# Patient Record
Sex: MALE | Race: ASIAN | Employment: FULL TIME | URBAN - METROPOLITAN AREA
[De-identification: names, ages, dates, MRNs, and addresses within clinical notes are randomized per-mention and may not be internally consistent; named-entity substitution may affect disease eponyms.]

---

## 2024-08-08 ENCOUNTER — NURSE ONLY (OUTPATIENT)
Dept: ALLERGY | Facility: CLINIC | Age: 59
End: 2024-08-08

## 2024-08-08 ENCOUNTER — OFFICE VISIT (OUTPATIENT)
Dept: ALLERGY | Facility: CLINIC | Age: 59
End: 2024-08-08
Payer: COMMERCIAL

## 2024-08-08 VITALS
HEART RATE: 84 BPM | BODY MASS INDEX: 26.6 KG/M2 | DIASTOLIC BLOOD PRESSURE: 78 MMHG | SYSTOLIC BLOOD PRESSURE: 124 MMHG | HEIGHT: 71 IN | OXYGEN SATURATION: 96 % | WEIGHT: 190 LBS

## 2024-08-08 DIAGNOSIS — H01.119 EYELID DERMATITIS, ALLERGIC/CONTACT: ICD-10-CM

## 2024-08-08 DIAGNOSIS — Z91.09 ENVIRONMENTAL ALLERGIES: ICD-10-CM

## 2024-08-08 DIAGNOSIS — Z23 NEED FOR COVID-19 VACCINE: ICD-10-CM

## 2024-08-08 DIAGNOSIS — H57.89 EYE SWELLING: Primary | ICD-10-CM

## 2024-08-08 DIAGNOSIS — Z23 FLU VACCINE NEED: ICD-10-CM

## 2024-08-08 DIAGNOSIS — H57.89 EYE SWELLING, BILATERAL: Primary | ICD-10-CM

## 2024-08-08 PROCEDURE — 99204 OFFICE O/P NEW MOD 45 MIN: CPT | Performed by: ALLERGY & IMMUNOLOGY

## 2024-08-08 PROCEDURE — 95004 PERQ TESTS W/ALRGNC XTRCS: CPT | Performed by: ALLERGY & IMMUNOLOGY

## 2024-08-08 RX ORDER — AMOXICILLIN 500 MG/1
CAPSULE ORAL
COMMUNITY

## 2024-08-08 RX ORDER — CIPROFLOXACIN 500 MG/1
500 TABLET, FILM COATED ORAL EVERY 12 HOURS
COMMUNITY
Start: 2024-02-16

## 2024-08-08 RX ORDER — MELOXICAM 15 MG/1
15 TABLET ORAL AS DIRECTED
COMMUNITY

## 2024-08-08 RX ORDER — FLUOCINOLONE ACETONIDE 0.11 MG/ML
OIL TOPICAL
Qty: 118 ML | Refills: 0 | Status: SHIPPED | OUTPATIENT
Start: 2024-08-08

## 2024-08-08 RX ORDER — TAMSULOSIN HYDROCHLORIDE 0.4 MG/1
1 CAPSULE ORAL DAILY
COMMUNITY

## 2024-08-08 RX ORDER — ATORVASTATIN CALCIUM 20 MG/1
1 TABLET, FILM COATED ORAL DAILY
COMMUNITY

## 2024-08-08 RX ORDER — HYDROCODONE BITARTRATE AND ACETAMINOPHEN 5; 325 MG/1; MG/1
TABLET ORAL
COMMUNITY

## 2024-08-08 NOTE — PATIENT INSTRUCTIONS
#1 eyelid dermatitis.  Appears more confined to the skin around the eye rather than within the eye itself.  No better with Zyrtec or Pataday in the past.  Some improvement with hydrocortisone 1% as needed.  Also noted associated with itchy scalp.  May consider eczematous dermatitis versus seborrheic dermatitis.  Patient with concern for cat as a trigger.  Recently moved into his current residency 2 months ago.  Prior residence had cats.  Reviewed May continue with hydrocortisone twice a day up to 7 to 10 days at a time if needed.  Cool compresses as needed  May also consider trial of Derma-Smoothe twice a day as needed  If refractory to above measures will consider Protopic is a steroid free topical use to treat eczematous dermatitis.  Moisturizers include Cetaphil CeraVe Vanicream Aquaphor    #2 environmental allergens  See above skin testing to common indoor allergens to screen for triggers    #3 flu vaccine recommended in the fall    4.  COVID-vaccine booster recommended.  Reviewed I do not have the booster in my office.  Please check with local pharmacy         Orders This Visit:  No orders of the defined types were placed in this encounter.      Meds This Visit:  Requested Prescriptions     Signed Prescriptions Disp Refills    Fluocinolone Acetonide Body (DERMA-SMOOTHE/FS BODY) 0.01 % External Oil 118 mL 0     Sig: Apply to areas of itchy skin 2x/day

## 2024-08-08 NOTE — PROGRESS NOTES
Marc Jorgensen is a 59 year old male.    HPI:     Chief Complaint   Patient presents with    Allergies     Patient states  he moved 2 months ago and previous tenant had a cat and he has been having eye puffiness since being there.thinks he may be allergic to cats.     Patient is a 59-year-old male who presents for allergy evaluation with a chief complaint of allergies.  Patient is new to [facility].  Reason for visit notes allergy around the eye.    No medications on record.  No immunizations on record    Today patient reports      Allergies?   Duration: 2+ months   Timing:persistent   Symptoms: patient states  he moved 2 months ago and previous tenant had a cat and he has been having eye puffiness since being there.thinks he may be allergic to cats.  Severity: moderate   Doing well today   Did well 1 week in [place]   Bilateral puffy eyes  Denies ie, we, rn sz   Status: no change   Triggers: cats?   Tried: hc1% pataday (no nbetter), zyrtec ( no better)   Pets or smokers: denies     Hx of asthma, ad, or food allergy: denies      From [place] originally      HISTORY:  History reviewed. No pertinent past medical history.   History reviewed. No pertinent surgical history.   History reviewed. No pertinent family history.   Social History:   Social History     Socioeconomic History    Marital status:    Tobacco Use    Smoking status: Never     Passive exposure: Never    Smokeless tobacco: Never   Substance and Sexual Activity    Alcohol use: Never    Drug use: Never        Medications (Active prior to today's visit):  Current Outpatient Medications   Medication Sig Dispense Refill    atorvastatin 20 MG Oral Tab Take 1 tablet (20 mg total) by mouth daily.      Fluocinolone Acetonide Body (DERMA-SMOOTHE/FS BODY) 0.01 % External Oil Apply to areas of itchy skin 2x/day 118 mL 0    amoxicillin 500 MG Oral Cap  (Patient not taking: Reported on 8/8/2024)      ciprofloxacin 500 MG Oral Tab Take 1 tablet (500 mg total) by mouth  Q12H. (Patient not taking: Reported on 8/8/2024)      HYDROcodone-acetaminophen 5-325 MG Oral Tab  (Patient not taking: Reported on 8/8/2024)      Meloxicam 15 MG Oral Tab Take 1 tablet (15 mg total) by mouth As Directed. (Patient not taking: Reported on 8/8/2024)      tamsulosin 0.4 MG Oral Cap Take 1 capsule (0.4 mg total) by mouth daily. (Patient not taking: Reported on 8/8/2024)         Allergies:  Allergies   Allergen Reactions    No Known Allergies UNKNOWN         ROS:     Allergic/Immuno:  See HPI  Cardiovascular:  Negative for irregular heartbeat/palpitations, chest pain, edema  Constitutional:  Negative night sweats,weight loss, irritability and lethargy  Endocrine:  Negative for cold intolerance, polydipsia and polyphagia  ENMT:  Negative for ear drainage, hearing loss and nasal drainage  Eyes:  Negative for eye discharge and vision loss  See hpi   Gastrointestinal:  Negative for abdominal pain, diarrhea and vomiting  Genitourinary:  Negative for dysuria and hematuria  Hema/Lymph:  Negative for easy bleeding and easy bruising  Integumentary:   see hpi   Musculoskeletal:  Negative for joint symptoms  Neurological:  Negative for dizziness, seizures  Psychiatric:  Negative for inappropriate interaction and psychiatric symptoms  Respiratory:  Negative for cough, dyspnea and wheezing      PHYSICAL EXAM:   Constitutional: responsive, no acute distress noted  Head/Face: NC/Atraumatic  Eyes/Vision: conjunctiva and lids are normal extraocular motion is intact   Ears/Audiometry: tympanic membranes are normal bilaterally hearing is grossly intact  Nose/Mouth/Throat: nose and throat are clear mucous membranes are moist   Neck/Thyroid: neck is supple without adenopathy  Lymphatic: no abnormal cervical, supraclavicular or axillary adenopathy is noted  Respiratory: normal to inspection lungs are clear to auscultation bilaterally normal respiratory effort   Cardiovascular: regular rate and rhythm no murmurs, gallups, or  rubs  Abdomen: soft non-tender non-distended  Skin/Hair: no unusual rashes present  Extremities: no edema, cyanosis, or clubbing  Neurological:Oriented to time, place, person & situation       ASSESSMENT/PLAN:   Assessment   Encounter Diagnoses   Name Primary?    Flu vaccine need     Need for COVID-19 vaccine     Environmental allergies     Eye swelling, bilateral Yes    Eyelid dermatitis, allergic/contact      Skin testing today to common indoor allergens  Was negative     Positive histamine control  Patient deferred skin testing to outdoor environmental allergens.    #1 eyelid dermatitis.  Appears more confined to the skin around the eye rather than within the eye itself.  No better with Zyrtec or Pataday in the past.  Some improvement with hydrocortisone 1% as needed.  Also noted associated with itchy scalp.  May consider eczematous dermatitis versus seborrheic dermatitis.  Patient with concern for cat as a trigger.  Recently moved into his current residency 2 months ago.  Prior residence had cats.  Reviewed May continue with hydrocortisone twice a day up to 7 to 10 days at a time if needed.  Cool compresses as needed  May also consider trial of Derma-Smoothe twice a day as needed  If refractory to above measures will consider Protopic is a steroid free topical use to treat eczematous dermatitis.  Moisturizers include Cetaphil CeraVe Vanicream Aquaphor    #2 environmental allergens  See above skin testing to common indoor allergens to screen for triggers    #3 flu vaccine recommended in the fall    4.  COVID-vaccine booster recommended.  Reviewed I do not have the booster in my office.  Please check with local pharmacy        I spent 45 minutes on the day of the encounter related to this visit, not including separately reported activities or procedures.  This may have included non face-to-face time activities such as same day chart review in preparing to see the patient, orders, documentation in the electronic  medical record, and/or communication with other healthcare professionals or family members/caregivers.       Orders This Visit:  No orders of the defined types were placed in this encounter.      Meds This Visit:  Requested Prescriptions     Signed Prescriptions Disp Refills    Fluocinolone Acetonide Body (DERMA-SMOOTHE/FS BODY) 0.01 % External Oil 118 mL 0     Sig: Apply to areas of itchy skin 2x/day       Imaging & Referrals:  None     8/8/2024  Apolinar Durand MD      If medication samples were provided today, they were provided solely for patient education and training related to self administration of these medications.  Teaching, instruction and sample was provided to the patient by myself.  Teaching included  a review of potential adverse side effects as well as potential efficacy.  Patient's questions were answered in regards to medication administration and dosing and potential side effects. Teaching was provided via the teach back method

## 2025-03-18 ENCOUNTER — TELEPHONE (OUTPATIENT)
Dept: ALLERGY | Facility: CLINIC | Age: 60
End: 2025-03-18

## 2025-03-18 NOTE — TELEPHONE ENCOUNTER
No referral has been received. Per chart, his insurance is a [employer] PPO plan, so he does not NEED a referral unless his insurance has changed.

## 2025-03-18 NOTE — TELEPHONE ENCOUNTER
Patient called asking to verify that his referral was received for his appointment on 03/20/2025.

## 2025-03-19 ENCOUNTER — TELEPHONE (OUTPATIENT)
Dept: ALLERGY | Facility: CLINIC | Age: 60
End: 2025-03-19

## 2025-03-19 NOTE — TELEPHONE ENCOUNTER
Received referral for patient from [facility]- Dr. Niko Acosta for contact dermatitis.    Referral placed on Dr. Durand's desk for review and to be signed then sent to scan.    Patient has an appointment scheduled for 3/20/25 at 11:30 am.

## 2025-03-20 ENCOUNTER — OFFICE VISIT (OUTPATIENT)
Dept: ALLERGY | Facility: CLINIC | Age: 60
End: 2025-03-20
Payer: COMMERCIAL

## 2025-03-20 DIAGNOSIS — L65.9 HAIR LOSS: ICD-10-CM

## 2025-03-20 DIAGNOSIS — L30.9 DERMATITIS: Primary | ICD-10-CM

## 2025-03-20 DIAGNOSIS — H57.89 EYE SWELLING, BILATERAL: ICD-10-CM

## 2025-03-20 PROCEDURE — 99214 OFFICE O/P EST MOD 30 MIN: CPT | Performed by: ALLERGY & IMMUNOLOGY

## 2025-03-20 RX ORDER — METHYLPREDNISOLONE 4 MG/1
TABLET ORAL
Qty: 21 TABLET | Refills: 0 | Status: SHIPPED | OUTPATIENT
Start: 2025-03-20

## 2025-03-20 RX ORDER — FLUOCINOLONE ACETONIDE 0.11 MG/ML
OIL TOPICAL
Qty: 118 ML | Refills: 0 | Status: SHIPPED | OUTPATIENT
Start: 2025-03-20

## 2025-03-20 NOTE — PROGRESS NOTES
Marc Jorgensen is a 59 year old male.    HPI:     Chief Complaint   Patient presents with    Allergies     Patient states he has itchy, watery eyes all the times with slight swelling.     Patient is a 59-year-old male who presents for follow-up.  Patient last seen by me in August 8, 2024.  Skin testing to environmental allergens on indoor allergens was negative.  Patient deferred testing to outdoor allergies at that time.  Clinical history of eyelid dermatitis  Medication list include Derma-Smoothe and hydrocortisone 2.5%.  Patient was referred by PCP referral notes concern for potential contact dermatitis    Immunizations reviewed.  COVID-vaccine x 5 doses last in October 2023  No flu vaccine on record      Today patient reports    History of Present Illness  Marc Jorgensen is a 59 year old male who presents with persistent dermatitis and scalp issues.    He has been experiencing persistent dermatitis and scalp issues, including a line on his forehead from wearing a hair band that has become more permanent and bothersome over time. He also notes dryness behind both ears, which improves when he travels away from his home in [place], suggesting a possible environmental trigger.    He describes an itchy scalp that improves with the application of DermaSmooth oil, used every two to three days. Without the oil, his scalp becomes very dry and uncomfortable. He has developed bumps on his scalp, which are not very itchy but concerning. Occasionally, his scalp feels wet, which he attributes to the oil.    He has a history of eyelid dermatitis, with previous testing for indoor allergies being unremarkable. He uses DermaSmooth oil, containing a mild topical steroid, to manage symptoms, applying it every two to three days, though the rash never fully resolves.    He uses hair coloring products every two to three weeks for the past twenty years and has noticed increased hair loss over the last few months. He is concerned about  whether the hair dye could be contributing to his symptoms.    No use of allergy medications or eye drops currently, although he has used them in the past. He reports occasional puffy eyes, particularly after returning from travel, and wonders if this is related to allergies. No red or watery eyes today, but sometimes experiences wet eyes. No other rashes or symptoms elsewhere on his body.         HISTORY:  History reviewed. No pertinent past medical history.   History reviewed. No pertinent surgical history.   History reviewed. No pertinent family history.   Social History:   Social History     Socioeconomic History    Marital status:    Tobacco Use    Smoking status: Never     Passive exposure: Never    Smokeless tobacco: Never   Substance and Sexual Activity    Alcohol use: Never    Drug use: Never        Medications (Active prior to today's visit):  Current Outpatient Medications   Medication Sig Dispense Refill    Fluocinolone Acetonide Body (DERMA-SMOOTHE/FS BODY) 0.01 % External Oil Apply to areas of itchy skin 2x/day 118 mL 0    methylPREDNISolone 4 MG Oral Tablet Therapy Pack Take as directed with food 21 tablet 0    atorvastatin 20 MG Oral Tab Take 1 tablet (20 mg total) by mouth daily.      amoxicillin 500 MG Oral Cap  (Patient not taking: Reported on 3/20/2025)      ciprofloxacin 500 MG Oral Tab Take 1 tablet (500 mg total) by mouth Q12H. (Patient not taking: Reported on 3/20/2025)      HYDROcodone-acetaminophen 5-325 MG Oral Tab  (Patient not taking: Reported on 3/20/2025)      Meloxicam 15 MG Oral Tab Take 1 tablet (15 mg total) by mouth As Directed. (Patient not taking: Reported on 3/20/2025)      tamsulosin 0.4 MG Oral Cap Take 1 capsule (0.4 mg total) by mouth daily. (Patient not taking: Reported on 3/20/2025)         Allergies:  Allergies[1]      ROS:     Allergic/Immuno:  See HPI  Cardiovascular:  Negative for irregular heartbeat/palpitations, chest pain, edema  Constitutional:  Negative  night sweats,weight loss, irritability and lethargy  Endocrine:  Negative for cold intolerance, polydipsia and polyphagia  ENMT:  Negative for ear drainage, hearing loss and nasal drainage some mild puffy eyes especially under under lower eyelid.   Eyes:  Negative for eye discharge and vision loss  Gastrointestinal:  Negative for abdominal pain, diarrhea and vomiting  Genitourinary:  Negative for dysuria and hematuria  Hema/Lymph:  Negative for easy bleeding and easy bruising  Integumentary:  Negative for pruritus and rash  Musculoskeletal:  Negative for joint symptoms  Neurological:  Negative for dizziness, seizures  Psychiatric:  Negative for inappropriate interaction and psychiatric symptoms  Respiratory:  Negative for cough, dyspnea and wheezing      PHYSICAL EXAM:   Constitutional: responsive, no acute distress noted  Head/Face: NC/Atraumatic  Eyes/Vision: conjunctiva and lids are normal extraocular motion is intact   Ears/Audiometry: tympanic membranes are normal bilaterally hearing is grossly intact  Nose/Mouth/Throat: nose and throat are clear mucous membranes are moist   Neck/Thyroid: neck is supple without adenopathy  Lymphatic: no abnormal cervical, supraclavicular or axillary adenopathy is noted  Respiratory: normal to inspection lungs are clear to auscultation bilaterally normal respiratory effort   Cardiovascular: regular rate and rhythm no murmurs, gallups, or rubs  Abdomen: soft non-tender non-distended  Skin/Hair: no unusual rashes present  Extremities: no edema, cyanosis, or clubbing  Neurological:Oriented to time, place, person & situation       ASSESSMENT/PLAN:   Assessment   Encounter Diagnoses   Name Primary?    Dermatitis Yes    Hair loss     Eye swelling, bilateral        Assessment & Plan  Dermatitis  Chronic dermatitis with dryness, itchiness, and bumps on the scalp, around the eyes, and behind the ears. Differential diagnosis includes seborrheic and contact dermatitis. Symptoms improve  with DermaSmooth oil, a mild topical steroid. He uses hair coloring products, which may contain p-phenylenediamine (PPD), a potential allergen causing contact dermatitis. DermaSmooth oil is safe for long-term use and can be applied up to twice daily for symptom control.  - Continue DermaSmooth oil application up to twice daily as needed for symptom control.  - Check hair coloring product for p-phenylenediamine (PPD) to rule out contact dermatitis.  - Consider referral to a dermatologist for further evaluation if symptoms persist.    Puffy eyes  Intermittent periorbital edema, possibly related to allergies. Symptoms worsened upon returning from travel, suggesting potential environmental triggers. A trial of oral antihistamines such as Xyzal or Zyrtec is recommended to address potential outdoor allergies.  - Trial of Xyzal (levocetirizine) 5 mg once daily or Zyrtec 10 mg once daily as a non-sedating antihistamine.  - Apply cool compresses as needed for symptomatic relief.  - Order serum IgE profile to evaluate for environmental allergens.    Hair loss  Recent onset of hair thinning, possibly related to age, hair dye use, or thyroid dysfunction. No recent thyroid function tests have been conducted. Thyroid dysfunction is easily treatable if identified.  - Order TSH level to assess thyroid function.            Orders This Visit:  Orders Placed This Encounter   Procedures    TSH W Reflex To Free T4    [facility] 8       Meds This Visit:  Requested Prescriptions     Signed Prescriptions Disp Refills    Fluocinolone Acetonide Body (DERMA-SMOOTHE/FS BODY) 0.01 % External Oil 118 mL 0     Sig: Apply to areas of itchy skin 2x/day    methylPREDNISolone 4 MG Oral Tablet Therapy Pack 21 tablet 0     Sig: Take as directed with food       Imaging & Referrals:  None     3/20/2025  Apolinar Durand MD      If medication samples were provided today, they were provided solely for patient education and training related to self  administration of these medications.  Teaching, instruction and sample was provided to the patient by myself.  Teaching included  a review of potential adverse side effects as well as potential efficacy.  Patient's questions were answered in regards to medication administration and dosing and potential side effects. Teaching was provided via the teach back method         [1]   Allergies  Allergen Reactions    No Known Allergies UNKNOWN

## 2025-03-20 NOTE — PROGRESS NOTES
The following individual(s) verbally consented to be recorded using ambient AI listening technology and understand that they can each withdraw their consent to this listening technology at any point by asking the clinician to turn off or pause the recording:    Patient name: Marc Jorgensen

## 2025-03-29 LAB
(T11) IGE: <0.1 KU/L
(T8) IGE: <0.1 KU/L
ALTERNARIA ALTERNATA (M6) IGE: <0.1 KU/L
ASPERGILLUS FUMIGATUS (M3) IGE: <0.1 KU/L
BERMUDA GRASS (G2) IGE: <0.1 KU/L
CAT DANDER (E1) IGE: <0.1 KU/L
CLADOSPORIUM HERBARUM (M2) IGE: <0.1 KU/L
CLASS: 0
COCKROACH (I6) IGE: <0.1 KU/L
COMMON RAGWEED (SHORT)$(W1) IGE: <0.1 KU/L
COTTONWOOD (T14) IGE: <0.1 KU/L
DERMATOPHAGOIDES FARINAE (D2) IGE: <0.1 KU/L
DERMATOPHAGOIDES PTERONYSSINUS (D1) IGE: <0.1 KU/L
DOG DANDER (E5) IGE: <0.1 KU/L
HICKORY/PECAN TREE (T22)$IGE: <0.1 KU/L
IMMUNOGLOBULIN E: 64 KU/L
MAPLE (BOX ELDER) (T1)$IGE: <0.1 KU/L
MOUNTAIN CEDAR (T6) IGE: <0.1 KU/L
MOUSE URINE PROTEINS (E72) IGE: <0.1 KU/L
OAK (T7) IGE: <0.1 KU/L
PENICILLIUM NOTATUM (M1) IGE: <0.1 KU/L
ROUGH MARSH ELDER (W16)$IGE: <0.1 KU/L
ROUGH PIGWEED (W14) IGE: <0.1 KU/L
RUSSIAN THISTLE (W11) IGE: <0.1 KU/L
TIMOTHY GRASS (G6) IGE: <0.1 KU/L
TSH W/REFLEX TO FT4: 1.24 MIU/L (ref 0.4–4.5)
WALNUT TREE (T10) IGE: <0.1 KU/L
WHITE ASH (T15) IGE: <0.1 KU/L
WHITE MULBERRY (T70) IGE: <0.1 KU/L

## 2025-03-31 ENCOUNTER — TELEPHONE (OUTPATIENT)
Dept: ALLERGY | Facility: CLINIC | Age: 60
End: 2025-03-31

## 2025-03-31 NOTE — TELEPHONE ENCOUNTER
Spoke with patient. Verified name and date of birth. Informed patient of test results per Dr. Durand. Patient verbalizes understanding.

## 2025-03-31 NOTE — TELEPHONE ENCOUNTER
----- Message from Apolinar Durand sent at 3/30/2025 12:24 PM CDT -----   Please contact patient with negative serum IgE testing to environmental allergens.